# Patient Record
Sex: MALE | Race: OTHER | HISPANIC OR LATINO | Employment: OTHER | URBAN - METROPOLITAN AREA
[De-identification: names, ages, dates, MRNs, and addresses within clinical notes are randomized per-mention and may not be internally consistent; named-entity substitution may affect disease eponyms.]

---

## 2020-09-15 ENCOUNTER — OFFICE VISIT (OUTPATIENT)
Dept: OBGYN CLINIC | Facility: HOSPITAL | Age: 45
End: 2020-09-15
Payer: COMMERCIAL

## 2020-09-15 ENCOUNTER — HOSPITAL ENCOUNTER (OUTPATIENT)
Dept: RADIOLOGY | Facility: HOSPITAL | Age: 45
Discharge: HOME | End: 2020-09-15
Attending: RADIOLOGY

## 2020-09-15 VITALS — WEIGHT: 198 LBS | DIASTOLIC BLOOD PRESSURE: 92 MMHG | SYSTOLIC BLOOD PRESSURE: 129 MMHG | HEART RATE: 84 BPM

## 2020-09-15 DIAGNOSIS — M89.9 BONE LESION: ICD-10-CM

## 2020-09-15 DIAGNOSIS — M54.12 CERVICAL NEURITIS: Primary | ICD-10-CM

## 2020-09-15 DIAGNOSIS — Z76.89 REFERRAL OF PATIENT WITHOUT EXAMINATION OR TREATMENT: ICD-10-CM

## 2020-09-15 PROCEDURE — 99203 OFFICE O/P NEW LOW 30 MIN: CPT | Performed by: PHYSICIAN ASSISTANT

## 2020-09-15 NOTE — PROGRESS NOTES
Assessment/Plan   Diagnoses and all orders for this visit:    Cervical neuritis  - Ambulatory referral to Physical Therapy  - Prednisone taper    Bone lesion       - Contrasted MRI    - Follow up with [facility]          Subjective   Patient ID: Vijay Allison is a 41 y o  male  Vitals:    09/15/20 1331   BP: 129/92   Pulse: 80     46yo male comes in for an evaluation of his left shoulder and arm  He used to have shoulder-specific pain, but over the last week, it has been different  The pain is in the anterior inferior shoulder and axilla  It radiates down his arm to the wrist and he has numbness in the index finger  The pain increases with neck motion  It is not affected by shoulder motion  There was no specific injury  He has not noticed weakness  He was seen in another facility where shoulder xrays showed a bone lesion and recommended an MRI  No fevers or chills  The following portions of the patient's history were reviewed and updated as appropriate: allergies, current medications, past family history, past medical history, past social history, past surgical history and problem list     Review of Systems  Ortho Exam  History reviewed  No pertinent past medical history  History reviewed  No pertinent surgical history  History reviewed  No pertinent family history  Social History     Occupational History    Not on file   Tobacco Use    Smoking status: Never Smoker    Smokeless tobacco: Never Used   Substance and Sexual Activity    Alcohol use: Yes     Comment: Social    Drug use: Not Currently    Sexual activity: Not on file       Review of Systems   Constitutional: Negative  HENT: Negative  Eyes: Negative  Respiratory: Negative  Cardiovascular: Negative  Gastrointestinal: Negative  Endocrine: Negative  Genitourinary: Negative  Musculoskeletal: As below      Allergic/Immunologic: Negative  Neurological: Negative  Hematological: Negative  Psychiatric/Behavioral: Negative  Objective   Physical Exam           · Constitutional: Awake, Alert, Oriented  · Eyes: EOMI  · Psych: Mood and affect appropriate  · Heart: regular rate and rhythm  · Lungs: No audible wheezing  · Abdomen: soft  · Lymph: no lymphedema       left Shoulder:  - Appearance   No swelling, discoloration, deformity, or ecchymosis  - Palpation   No tenderness to palpation of the clavicle, AC joint, biceps tendon, scapula, or proximal humerus  - ROM   Full and pain-free active ROM of the shoulder  - Motor   Internal and external rotation 5/5 with shoulder at side, flexion 5/5  - Special tests   Empty Can Test negative and Hawkin's Impingement Test negative  - NVI distally       left Neck / CSpine:  - Appearance   No rash, erythema, or ecchymosis  - Palpation   No tenderness to palpation of the midline bony landmarks, paraspinal musculature, Trapezius  - ROM   Full, active ROM    + arm pain with flexion, extension, and lateral flexion to the left  - Motor   5/5 in all UE myotomes  - Sensation   Intact to light touch in all UE dermatomes except: diminished in the index finger  - Special Tests   Spurlings positive

## 2020-09-22 ENCOUNTER — EVALUATION (OUTPATIENT)
Dept: PHYSICAL THERAPY | Facility: CLINIC | Age: 45
End: 2020-09-22
Payer: COMMERCIAL

## 2020-09-22 DIAGNOSIS — M54.12 CERVICAL NEURITIS: ICD-10-CM

## 2020-09-22 PROCEDURE — 97140 MANUAL THERAPY 1/> REGIONS: CPT

## 2020-09-22 PROCEDURE — 97161 PT EVAL LOW COMPLEX 20 MIN: CPT

## 2020-09-22 PROCEDURE — 97112 NEUROMUSCULAR REEDUCATION: CPT

## 2020-09-22 NOTE — PROGRESS NOTES
PT Evaluation     Today's date: 2020  Patient name: Elliot Wick  : 1975  MRN: [id number]  Referring provider: Charles Garay  Dx:   Encounter Diagnosis     ICD-10-CM    1  Cervical neuritis  M54 12 Ambulatory referral to Physical Therapy       Start Time: 805  Stop Time: 900  Total time in clinic (min): 55 minutes    Assessment  Assessment details: Patient is a 42 yo male presenting to physical therapy with symptoms consistent with L sided cervical radiculopathy that started about 2-3 weeks ago  Patient presents with decreased cervical AROM, cervical spine hypomobility, decreased cervical, scapular and shoulder strength and poor posture  Patient has increased pain with gripping, lifting, fast movements and sleeping  PT will address the noted impairments by performing cervical, scapular and shoulder strengthening, stretching, posture training, functional activities and manual techniques to allow the patient to return to his PLOF  PT recommended 2x/week for 4-6 weeks c a good prognosis 2* PLOF  Impairments: abnormal or restricted ROM, activity intolerance, impaired physical strength, lacks appropriate home exercise program, pain with function, poor posture  and poor body mechanics    Symptom irritability: lowUnderstanding of Dx/Px/POC: good   Prognosis: good    Goals  STG: In four weeks the patient will:    1  Be (I) with his HEP  2  Increase cervical, scapular and shoulder strength to 4+/5 MMT score to assist c ADLs  3  Increase cervical spine ROM by 25% to assist c driving and his job  LTG: In six weeks, the patient will:    1  Increase FOTO score to 78 to demonstrate improvements in symptoms and function  2  Demonstrate full cervical AROM without pain  3  Lift 25-50# without pain  4  Increase shoulder and scapular strength to 5/5 MMT score to assist c prolonged activities  5  Sleep 6-7 hours without pain  6  Drive for 4 hours without pain          Plan  Patient would benefit from: skilled physical therapy  Planned modality interventions: cryotherapy and thermotherapy: hydrocollator packs  Planned therapy interventions: abdominal trunk stabilization, joint mobilization, manual therapy, massage, Jean taping, neuromuscular re-education, patient education, postural training, strengthening, stretching, therapeutic activities, therapeutic exercise, transfer training, home exercise program, functional ROM exercises, flexibility, body mechanics training and breathing training  Frequency: 2x week  Duration in visits: 12  Duration in weeks: 6  Plan of Care beginning date: 2020  Plan of Care expiration date: 11/3/2020  Treatment plan discussed with: patient        Subjective Evaluation    History of Present Illness  Mechanism of injury: Patient noted about two weeks ago he started to notice pain in the L shoulder when he looks up and side bends to the L  Patient noted the pain is aggravated by sleeping on the shoulder, lifting, sudden movement and driving  Patient noted the 2nd L digit is numb all the time, however the other fingers become painful with motion of the neck             Recurrent probem    Quality of life: fair    Pain  Current pain ratin  At best pain ratin  At worst pain ratin  Location: L posterior shoulder to L hand  Quality: dull ache  Relieving factors: medications  Aggravating factors: lifting and overhead activity  Progression: improved    Social Support  Steps to enter house: yes  Lives in: one-story house  Lives with: young children and spouse    Employment status: working ()  Hand dominance: right      Diagnostic Tests  X-ray: abnormal  Patient Goals  Patient goals for therapy: decreased pain, improved balance, increased motion, increased strength, independence with ADLs/IADLs, return to sport/leisure activities and return to work  Patient goal: "to perform a full day of work without pain "        Objective     Concurrent Complaints  Negative for dizziness and headaches    Postural Observations  Seated posture: fair  Standing posture: fair  Correction of posture: makes symptoms better    Additional Postural Observation Details  PT educated the patient on a towel roll  Palpation   Left   Tenderness of the pectoralis major and pectoralis minor  Neurological Testing     Sensation   Cervical/Thoracic   Left   Intact: light touch    Right   Intact: light touch    Active Range of Motion   Cervical/Thoracic Spine       Cervical    Flexion:  with pain Restriction level: moderate  Extension:  with pain Restriction level: maximal  Left lateral flexion:  with pain Restriction level: moderate  Right lateral flexion:  Restriction level minimal  Left rotation:  Restriction level: minimal  Right rotation:  Restriction level: minimal    Additional Active Range of Motion Details  (B) shoulder AROM WNL       Joint Play     Hypomobile: C2, C3, C4, C5 and C6     Pain: C2, C3, C4, C5 and C6   C2 comments: Pain on L  C3 comments: Pain on L  C4 comments: Pain on L  C5 comments: Pain on L  C6 comments: Pain on L    Strength/Myotome Testing     Left Shoulder     Planes of Motion   Flexion: 4+   Abduction: 4   External rotation at 0°: 4   Internal rotation at 0°: 4     Right Shoulder     Planes of Motion   Flexion: 4+   Abduction: 4   External rotation at 0°: 4+   Internal rotation at 0°: 4+     Left Elbow   Flexion: 4+  Extension: 4+    Right Elbow   Flexion: 5  Extension: 5    Left Wrist/Hand   Wrist extension: 4+  Wrist flexion: 4+     (2nd hand position)     Trial 1: 105    Right Wrist/Hand   Wrist extension: 4+  Wrist flexion: 4+     (2nd hand position)     Trial 1: 120  Neuro Exam:     Headaches   Patient reports headaches: No        Flowsheet Rows      Most Recent Value   PT/OT G-Codes   Current Score  74   Projected Score  78             Precautions: none      Manuals 9/22            Cervical upglides and down glides MW  Grade  III Sub-occipital release MW            Median nerve glides nv                         Neuro Re-Ed             Chin tuck x10  5" hold            Median self nerve glides x15            Serratus punch nv            Scapular retractions 2x10  5" hold            Posture education MW                                      Ther Ex             UBE nv            pec stretch nv            Open books nv            Skier stretch nv            sidelying ER nv            Prone I and T nv                                      Ther Activity                                       Gait Training                                       Modalities

## 2020-09-24 ENCOUNTER — OFFICE VISIT (OUTPATIENT)
Dept: PHYSICAL THERAPY | Facility: CLINIC | Age: 45
End: 2020-09-24
Payer: COMMERCIAL

## 2020-09-24 DIAGNOSIS — M54.12 CERVICAL NEURITIS: Primary | ICD-10-CM

## 2020-09-24 PROCEDURE — 97112 NEUROMUSCULAR REEDUCATION: CPT

## 2020-09-24 PROCEDURE — 97140 MANUAL THERAPY 1/> REGIONS: CPT

## 2020-09-24 NOTE — PROGRESS NOTES
Daily Note     Today's date: 2020  Patient name: Jodi Yuan  : 1975  MRN: [id number]  Referring provider: Donna Whitney  Dx:   Encounter Diagnosis     ICD-10-CM    1  Cervical neuritis  M54 12        Start Time: 3810  Stop Time: 1625  Total time in clinic (min): 55 minutes    Subjective: Patient noted the pain in the L shoulder has decreased and his numbness is decreasing as well  Objective: See treatment diary below      Assessment: Patient performed various strengthening and stretching exercises to assist c pain levels in the neck and shoulder  Patient required min VCs for form throughout the session  Patient continues to note a decrease in numbness after median nerve glides  Patient noted no increased pain at the end of the session  Patient would benefit from continued PT to allow the patient to return to his PLOF  Plan: Continue per plan of care        Precautions: none      Manuals            Cervical upglides and down glides MW  Grade  III            Sub-occipital release MW            Median nerve glides nv MW           L shoulder distraction and post mob  MW  Grade III           Neuro Re-Ed             Chin tuck x10  5" hold 2x10  5" hold           Median self nerve glides x15 x15           Serratus punch nv 2x10           Scapular retractions 2x10  5" hold 2x10  5" hold           Posture education MW                                      Ther Ex             UBE retro nv 5'           pec stretch nv 3x15" pain           Open books nv 5x15" ea           Skier stretch nv nv           sidelying ER nv 2x10 ea           Prone I and T nv nv                                     Ther Activity                                       Gait Training                                       Modalities

## 2020-09-28 ENCOUNTER — OFFICE VISIT (OUTPATIENT)
Dept: OBGYN CLINIC | Facility: CLINIC | Age: 45
End: 2020-09-28
Payer: COMMERCIAL

## 2020-09-28 VITALS — BODY MASS INDEX: 31.82 KG/M2 | WEIGHT: 198 LBS | RESPIRATION RATE: 18 BRPM | TEMPERATURE: 98.2 F | HEIGHT: 66 IN

## 2020-09-28 DIAGNOSIS — M75.42 IMPINGEMENT SYNDROME OF LEFT SHOULDER: ICD-10-CM

## 2020-09-28 DIAGNOSIS — M54.12 RADICULOPATHY, CERVICAL REGION: Primary | ICD-10-CM

## 2020-09-28 PROCEDURE — 99213 OFFICE O/P EST LOW 20 MIN: CPT | Performed by: STUDENT IN AN ORGANIZED HEALTH CARE EDUCATION/TRAINING PROGRAM

## 2020-10-01 ENCOUNTER — OFFICE VISIT (OUTPATIENT)
Dept: PHYSICAL THERAPY | Facility: CLINIC | Age: 45
End: 2020-10-01
Payer: COMMERCIAL

## 2020-10-01 DIAGNOSIS — M54.12 CERVICAL NEURITIS: Primary | ICD-10-CM

## 2020-10-01 PROCEDURE — 97112 NEUROMUSCULAR REEDUCATION: CPT

## 2020-10-01 PROCEDURE — 97140 MANUAL THERAPY 1/> REGIONS: CPT

## 2020-10-05 ENCOUNTER — APPOINTMENT (OUTPATIENT)
Dept: PHYSICAL THERAPY | Facility: CLINIC | Age: 45
End: 2020-10-05
Payer: COMMERCIAL

## 2020-10-07 ENCOUNTER — APPOINTMENT (OUTPATIENT)
Dept: PHYSICAL THERAPY | Facility: CLINIC | Age: 45
End: 2020-10-07
Payer: COMMERCIAL

## 2024-09-25 ENCOUNTER — TELEPHONE (OUTPATIENT)
Age: 49
End: 2024-09-25

## 2024-09-25 NOTE — TELEPHONE ENCOUNTER
New patient called to establish care in [place] for frequent urination.    Pt is scheduled with SINGH Caban on 10/10 at 1:40 PM.    Pt will rbing current list of meds, and insurance and ID to the visit.    No further action needed at this time.